# Patient Record
Sex: MALE | Race: WHITE | NOT HISPANIC OR LATINO | URBAN - METROPOLITAN AREA
[De-identification: names, ages, dates, MRNs, and addresses within clinical notes are randomized per-mention and may not be internally consistent; named-entity substitution may affect disease eponyms.]

---

## 2018-02-24 ENCOUNTER — OUTPATIENT (OUTPATIENT)
Dept: OUTPATIENT SERVICES | Facility: HOSPITAL | Age: 53
LOS: 1 days | Discharge: HOME | End: 2018-02-24

## 2018-02-24 ENCOUNTER — EMERGENCY (EMERGENCY)
Facility: HOSPITAL | Age: 53
LOS: 1 days | Discharge: HOME | End: 2018-02-24
Attending: EMERGENCY MEDICINE | Admitting: EMERGENCY MEDICINE
Payer: COMMERCIAL

## 2018-02-24 VITALS
TEMPERATURE: 98 F | OXYGEN SATURATION: 98 % | DIASTOLIC BLOOD PRESSURE: 94 MMHG | SYSTOLIC BLOOD PRESSURE: 138 MMHG | RESPIRATION RATE: 20 BRPM | HEART RATE: 75 BPM

## 2018-02-24 DIAGNOSIS — Z87.891 PERSONAL HISTORY OF NICOTINE DEPENDENCE: ICD-10-CM

## 2018-02-24 DIAGNOSIS — R07.89 OTHER CHEST PAIN: ICD-10-CM

## 2018-02-24 DIAGNOSIS — I10 ESSENTIAL (PRIMARY) HYPERTENSION: ICD-10-CM

## 2018-02-24 DIAGNOSIS — Z98.89 OTHER SPECIFIED POSTPROCEDURAL STATES: Chronic | ICD-10-CM

## 2018-02-24 DIAGNOSIS — C18.9 MALIGNANT NEOPLASM OF COLON, UNSPECIFIED: ICD-10-CM

## 2018-02-24 PROCEDURE — 93010 ELECTROCARDIOGRAM REPORT: CPT

## 2018-02-24 PROCEDURE — 99285 EMERGENCY DEPT VISIT HI MDM: CPT | Mod: 25

## 2018-02-24 RX ORDER — ASPIRIN/CALCIUM CARB/MAGNESIUM 324 MG
325 TABLET ORAL ONCE
Qty: 0 | Refills: 0 | Status: COMPLETED | OUTPATIENT
Start: 2018-02-24 | End: 2018-02-24

## 2018-02-24 NOTE — ED PROVIDER NOTE - MEDICAL DECISION MAKING DETAILS
Low-risk CP, not of anginal quality w/NSR on EKG w/out ischemic changes, negative trop, unremarkable CXR w/out infiltrate, primo abnormality, or pleural congestion, comfortable in ED after ASA, likely MSK pain, can f/u with PMD this week who will refer pt to cardiologist. Given return precautions and anticipatory guidance.

## 2018-02-24 NOTE — ED PROVIDER NOTE - PHYSICAL EXAMINATION
VITAL SIGNS: I have reviewed nursing notes and confirm.  CONSTITUTIONAL: Well-developed; well-nourished muscular man lying calmly in stretcher moving all ext speaking clearly in complete sentences, A&Ox3, no air hunger; in no acute distress.  SKIN: Agree with RN documentation regarding decubitus evaluation. Remainder of skin exam is warm and dry, no acute rash.  HEAD: Normocephalic; atraumatic.  EYES: PERRL, EOM intact; conjunctiva and sclera clear.  ENT: No nasal discharge; airway clear.  NECK: Supple; non tender.  CARD: S1, S2 normal; no murmurs, gallops, or rubs. RRR  RESP: No wheezes, rales or rhonchi. CTA w/good excursion, no chest wall TTP or overlying skin changes  ABD: Normal bowel sounds; soft; non-distended; non-tender; no hepatosplenomegaly.  EXT: Normal ROM. No clubbing, cyanosis or edema.  LYMPH: No acute cervical adenopathy.  NEURO: Alert, oriented. Grossly unremarkable.  PSYCH: Cooperative, appropriate.

## 2018-02-24 NOTE — ED PROVIDER NOTE - OBJECTIVE STATEMENT
54 y/o M w/hx of HTN though improved in past few years not on meds, former smoker, works construction/heavy lifting/frequents the gym, p/w 2 days of on/off L sided pinching chest pain non-radiating. Denies concomitant SOB/palpitations. No pleuritic or positional component. No exertional component, states he has been able to go about his normal activities w/out fatigue or intolerance. Denies fever/chills or recent illness. No n/v or diaphoresis. No LE pain or swelling. Denies travel. Had yearly physical (without stress test) 2 months ago and "everything was normal." Currently rating CP 2/10.

## 2018-02-25 VITALS
HEART RATE: 62 BPM | OXYGEN SATURATION: 97 % | DIASTOLIC BLOOD PRESSURE: 90 MMHG | RESPIRATION RATE: 20 BRPM | TEMPERATURE: 98 F | SYSTOLIC BLOOD PRESSURE: 134 MMHG

## 2018-02-25 LAB
ALBUMIN SERPL ELPH-MCNC: 4.1 G/DL — SIGNIFICANT CHANGE UP (ref 3.3–5)
ALP SERPL-CCNC: 64 U/L — SIGNIFICANT CHANGE UP (ref 40–120)
ALT FLD-CCNC: 36 U/L — SIGNIFICANT CHANGE UP (ref 10–45)
ANION GAP SERPL CALC-SCNC: 11 MMOL/L — SIGNIFICANT CHANGE UP (ref 5–17)
APTT BLD: 29 SEC — SIGNIFICANT CHANGE UP (ref 27.5–37.4)
AST SERPL-CCNC: 33 U/L — SIGNIFICANT CHANGE UP (ref 10–40)
BASOPHILS NFR BLD AUTO: 0.3 % — SIGNIFICANT CHANGE UP (ref 0–2)
BILIRUB SERPL-MCNC: 0.3 MG/DL — SIGNIFICANT CHANGE UP (ref 0.2–1.2)
BUN SERPL-MCNC: 17 MG/DL — SIGNIFICANT CHANGE UP (ref 7–23)
CALCIUM SERPL-MCNC: 9.4 MG/DL — SIGNIFICANT CHANGE UP (ref 8.4–10.5)
CHLORIDE SERPL-SCNC: 100 MMOL/L — SIGNIFICANT CHANGE UP (ref 96–108)
CO2 SERPL-SCNC: 25 MMOL/L — SIGNIFICANT CHANGE UP (ref 22–31)
CREAT SERPL-MCNC: 1.16 MG/DL — SIGNIFICANT CHANGE UP (ref 0.5–1.3)
EOSINOPHIL NFR BLD AUTO: 2.5 % — SIGNIFICANT CHANGE UP (ref 0–6)
GLUCOSE SERPL-MCNC: 106 MG/DL — HIGH (ref 70–99)
HCT VFR BLD CALC: 45.7 % — SIGNIFICANT CHANGE UP (ref 39–50)
HGB BLD-MCNC: 15.1 G/DL — SIGNIFICANT CHANGE UP (ref 13–17)
INR BLD: 0.96 — SIGNIFICANT CHANGE UP (ref 0.88–1.16)
LYMPHOCYTES # BLD AUTO: 27.7 % — SIGNIFICANT CHANGE UP (ref 13–44)
MCHC RBC-ENTMCNC: 29.7 PG — SIGNIFICANT CHANGE UP (ref 27–34)
MCHC RBC-ENTMCNC: 33 G/DL — SIGNIFICANT CHANGE UP (ref 32–36)
MCV RBC AUTO: 90 FL — SIGNIFICANT CHANGE UP (ref 80–100)
MONOCYTES NFR BLD AUTO: 7.4 % — SIGNIFICANT CHANGE UP (ref 2–14)
NEUTROPHILS NFR BLD AUTO: 62.1 % — SIGNIFICANT CHANGE UP (ref 43–77)
NT-PROBNP SERPL-SCNC: 7 PG/ML — SIGNIFICANT CHANGE UP (ref 0–300)
PLATELET # BLD AUTO: 328 K/UL — SIGNIFICANT CHANGE UP (ref 150–400)
POTASSIUM SERPL-MCNC: 3.8 MMOL/L — SIGNIFICANT CHANGE UP (ref 3.5–5.3)
POTASSIUM SERPL-SCNC: 3.8 MMOL/L — SIGNIFICANT CHANGE UP (ref 3.5–5.3)
PROT SERPL-MCNC: 6.7 G/DL — SIGNIFICANT CHANGE UP (ref 6–8.3)
PROTHROM AB SERPL-ACNC: 10.6 SEC — SIGNIFICANT CHANGE UP (ref 9.8–12.7)
RBC # BLD: 5.08 M/UL — SIGNIFICANT CHANGE UP (ref 4.2–5.8)
RBC # FLD: 12.5 % — SIGNIFICANT CHANGE UP (ref 10.3–16.9)
SODIUM SERPL-SCNC: 136 MMOL/L — SIGNIFICANT CHANGE UP (ref 135–145)
TROPONIN T SERPL-MCNC: <0.01 NG/ML — SIGNIFICANT CHANGE UP (ref 0–0.01)
WBC # BLD: 9.7 K/UL — SIGNIFICANT CHANGE UP (ref 3.8–10.5)
WBC # FLD AUTO: 9.7 K/UL — SIGNIFICANT CHANGE UP (ref 3.8–10.5)

## 2018-02-25 PROCEDURE — 71045 X-RAY EXAM CHEST 1 VIEW: CPT | Mod: 26

## 2018-02-25 PROCEDURE — 85025 COMPLETE CBC W/AUTO DIFF WBC: CPT

## 2018-02-25 PROCEDURE — 85610 PROTHROMBIN TIME: CPT

## 2018-02-25 PROCEDURE — 85730 THROMBOPLASTIN TIME PARTIAL: CPT

## 2018-02-25 PROCEDURE — 99283 EMERGENCY DEPT VISIT LOW MDM: CPT | Mod: 25

## 2018-02-25 PROCEDURE — 83880 ASSAY OF NATRIURETIC PEPTIDE: CPT

## 2018-02-25 PROCEDURE — 36415 COLL VENOUS BLD VENIPUNCTURE: CPT

## 2018-02-25 PROCEDURE — 71045 X-RAY EXAM CHEST 1 VIEW: CPT

## 2018-02-25 PROCEDURE — 93005 ELECTROCARDIOGRAM TRACING: CPT

## 2018-02-25 PROCEDURE — 80053 COMPREHEN METABOLIC PANEL: CPT

## 2018-02-25 PROCEDURE — 84484 ASSAY OF TROPONIN QUANT: CPT

## 2018-02-25 RX ADMIN — Medication 325 MILLIGRAM(S): at 00:10

## 2018-03-18 ENCOUNTER — OUTPATIENT (OUTPATIENT)
Dept: OUTPATIENT SERVICES | Facility: HOSPITAL | Age: 53
LOS: 1 days | Discharge: HOME | End: 2018-03-18

## 2018-03-18 DIAGNOSIS — Z98.89 OTHER SPECIFIED POSTPROCEDURAL STATES: Chronic | ICD-10-CM

## 2018-03-19 ENCOUNTER — APPOINTMENT (OUTPATIENT)
Dept: UROLOGY | Facility: CLINIC | Age: 53
End: 2018-03-19
Payer: COMMERCIAL

## 2018-03-19 VITALS
BODY MASS INDEX: 28.63 KG/M2 | SYSTOLIC BLOOD PRESSURE: 123 MMHG | HEIGHT: 70 IN | DIASTOLIC BLOOD PRESSURE: 81 MMHG | WEIGHT: 200 LBS | HEART RATE: 55 BPM

## 2018-03-19 DIAGNOSIS — N40.0 BENIGN PROSTATIC HYPERPLASIA WITHOUT LOWER URINARY TRACT SYMPMS: ICD-10-CM

## 2018-03-19 DIAGNOSIS — Z87.828 PERSONAL HISTORY OF OTHER (HEALED) PHYSICAL INJURY AND TRAUMA: ICD-10-CM

## 2018-03-19 DIAGNOSIS — Z80.42 FAMILY HISTORY OF MALIGNANT NEOPLASM OF PROSTATE: ICD-10-CM

## 2018-03-19 DIAGNOSIS — R39.12 POOR URINARY STREAM: ICD-10-CM

## 2018-03-19 DIAGNOSIS — Z78.9 OTHER SPECIFIED HEALTH STATUS: ICD-10-CM

## 2018-03-19 DIAGNOSIS — R35.0 FREQUENCY OF MICTURITION: ICD-10-CM

## 2018-03-19 DIAGNOSIS — R35.1 NOCTURIA: ICD-10-CM

## 2018-03-19 PROCEDURE — 99203 OFFICE O/P NEW LOW 30 MIN: CPT

## 2018-03-20 DIAGNOSIS — R35.1 NOCTURIA: ICD-10-CM

## 2018-03-22 LAB
APPEARANCE: CLEAR
BILIRUBIN URINE: NEGATIVE
BLOOD URINE: NEGATIVE
COLOR: YELLOW
GLUCOSE QUALITATIVE U: NEGATIVE MG/DL
KETONES URINE: NEGATIVE
LEUKOCYTE ESTERASE URINE: NEGATIVE
NITRITE URINE: NEGATIVE
PH URINE: 5
PROTEIN URINE: NEGATIVE MG/DL
SPECIFIC GRAVITY URINE: 1.02
UROBILINOGEN URINE: NEGATIVE MG/DL

## 2018-07-24 ENCOUNTER — EMERGENCY (EMERGENCY)
Facility: HOSPITAL | Age: 53
LOS: 1 days | Discharge: HOME | End: 2018-07-24
Attending: EMERGENCY MEDICINE | Admitting: EMERGENCY MEDICINE
Payer: COMMERCIAL

## 2018-07-24 VITALS
DIASTOLIC BLOOD PRESSURE: 91 MMHG | OXYGEN SATURATION: 98 % | SYSTOLIC BLOOD PRESSURE: 135 MMHG | TEMPERATURE: 98 F | RESPIRATION RATE: 20 BRPM | HEART RATE: 70 BPM

## 2018-07-24 VITALS
RESPIRATION RATE: 20 BRPM | HEIGHT: 70 IN | DIASTOLIC BLOOD PRESSURE: 91 MMHG | OXYGEN SATURATION: 97 % | WEIGHT: 200.84 LBS | HEART RATE: 67 BPM | SYSTOLIC BLOOD PRESSURE: 128 MMHG | TEMPERATURE: 98 F

## 2018-07-24 DIAGNOSIS — Z98.89 OTHER SPECIFIED POSTPROCEDURAL STATES: Chronic | ICD-10-CM

## 2018-07-24 DIAGNOSIS — I10 ESSENTIAL (PRIMARY) HYPERTENSION: ICD-10-CM

## 2018-07-24 DIAGNOSIS — R07.2 PRECORDIAL PAIN: ICD-10-CM

## 2018-07-24 LAB
ALBUMIN SERPL ELPH-MCNC: 4.2 G/DL — SIGNIFICANT CHANGE UP (ref 3.3–5)
ALP SERPL-CCNC: 58 U/L — SIGNIFICANT CHANGE UP (ref 40–120)
ALT FLD-CCNC: 27 U/L — SIGNIFICANT CHANGE UP (ref 10–45)
ANION GAP SERPL CALC-SCNC: 10 MMOL/L — SIGNIFICANT CHANGE UP (ref 5–17)
APTT BLD: 26.2 SEC — LOW (ref 27.5–37.4)
AST SERPL-CCNC: 31 U/L — SIGNIFICANT CHANGE UP (ref 10–40)
BASOPHILS NFR BLD AUTO: 0.3 % — SIGNIFICANT CHANGE UP (ref 0–2)
BILIRUB SERPL-MCNC: 0.5 MG/DL — SIGNIFICANT CHANGE UP (ref 0.2–1.2)
BUN SERPL-MCNC: 14 MG/DL — SIGNIFICANT CHANGE UP (ref 7–23)
CALCIUM SERPL-MCNC: 9.4 MG/DL — SIGNIFICANT CHANGE UP (ref 8.4–10.5)
CHLORIDE SERPL-SCNC: 102 MMOL/L — SIGNIFICANT CHANGE UP (ref 96–108)
CK MB CFR SERPL CALC: 12.4 NG/ML — HIGH (ref 0–6.7)
CK SERPL-CCNC: 650 U/L — HIGH (ref 30–200)
CO2 SERPL-SCNC: 28 MMOL/L — SIGNIFICANT CHANGE UP (ref 22–31)
CREAT SERPL-MCNC: 1.35 MG/DL — HIGH (ref 0.5–1.3)
EOSINOPHIL NFR BLD AUTO: 1.2 % — SIGNIFICANT CHANGE UP (ref 0–6)
GLUCOSE SERPL-MCNC: 108 MG/DL — HIGH (ref 70–99)
HCT VFR BLD CALC: 44.9 % — SIGNIFICANT CHANGE UP (ref 39–50)
HGB BLD-MCNC: 14.5 G/DL — SIGNIFICANT CHANGE UP (ref 13–17)
INR BLD: 0.96 — SIGNIFICANT CHANGE UP (ref 0.88–1.16)
LYMPHOCYTES # BLD AUTO: 20.5 % — SIGNIFICANT CHANGE UP (ref 13–44)
MCHC RBC-ENTMCNC: 30.3 PG — SIGNIFICANT CHANGE UP (ref 27–34)
MCHC RBC-ENTMCNC: 32.3 G/DL — SIGNIFICANT CHANGE UP (ref 32–36)
MCV RBC AUTO: 93.7 FL — SIGNIFICANT CHANGE UP (ref 80–100)
MONOCYTES NFR BLD AUTO: 5.9 % — SIGNIFICANT CHANGE UP (ref 2–14)
NEUTROPHILS NFR BLD AUTO: 72.1 % — SIGNIFICANT CHANGE UP (ref 43–77)
PLATELET # BLD AUTO: 291 K/UL — SIGNIFICANT CHANGE UP (ref 150–400)
POTASSIUM SERPL-MCNC: 4.8 MMOL/L — SIGNIFICANT CHANGE UP (ref 3.5–5.3)
POTASSIUM SERPL-SCNC: 4.8 MMOL/L — SIGNIFICANT CHANGE UP (ref 3.5–5.3)
PROT SERPL-MCNC: 6.9 G/DL — SIGNIFICANT CHANGE UP (ref 6–8.3)
PROTHROM AB SERPL-ACNC: 10.7 SEC — SIGNIFICANT CHANGE UP (ref 9.8–12.7)
RBC # BLD: 4.79 M/UL — SIGNIFICANT CHANGE UP (ref 4.2–5.8)
RBC # FLD: 12.5 % — SIGNIFICANT CHANGE UP (ref 10.3–16.9)
SODIUM SERPL-SCNC: 140 MMOL/L — SIGNIFICANT CHANGE UP (ref 135–145)
TROPONIN T SERPL-MCNC: <0.01 NG/ML — SIGNIFICANT CHANGE UP (ref 0–0.01)
WBC # BLD: 9.5 K/UL — SIGNIFICANT CHANGE UP (ref 3.8–10.5)
WBC # FLD AUTO: 9.5 K/UL — SIGNIFICANT CHANGE UP (ref 3.8–10.5)

## 2018-07-24 PROCEDURE — 99285 EMERGENCY DEPT VISIT HI MDM: CPT | Mod: 25

## 2018-07-24 PROCEDURE — 36415 COLL VENOUS BLD VENIPUNCTURE: CPT

## 2018-07-24 PROCEDURE — 80053 COMPREHEN METABOLIC PANEL: CPT

## 2018-07-24 PROCEDURE — 71046 X-RAY EXAM CHEST 2 VIEWS: CPT

## 2018-07-24 PROCEDURE — 85730 THROMBOPLASTIN TIME PARTIAL: CPT

## 2018-07-24 PROCEDURE — 84484 ASSAY OF TROPONIN QUANT: CPT

## 2018-07-24 PROCEDURE — 71046 X-RAY EXAM CHEST 2 VIEWS: CPT | Mod: 26

## 2018-07-24 PROCEDURE — 82553 CREATINE MB FRACTION: CPT

## 2018-07-24 PROCEDURE — 99284 EMERGENCY DEPT VISIT MOD MDM: CPT | Mod: 25

## 2018-07-24 PROCEDURE — 82550 ASSAY OF CK (CPK): CPT

## 2018-07-24 PROCEDURE — 93010 ELECTROCARDIOGRAM REPORT: CPT

## 2018-07-24 PROCEDURE — 85610 PROTHROMBIN TIME: CPT

## 2018-07-24 PROCEDURE — 85025 COMPLETE CBC W/AUTO DIFF WBC: CPT

## 2018-07-24 PROCEDURE — 93005 ELECTROCARDIOGRAM TRACING: CPT

## 2018-07-24 RX ORDER — SODIUM CHLORIDE 9 MG/ML
3 INJECTION INTRAMUSCULAR; INTRAVENOUS; SUBCUTANEOUS ONCE
Qty: 0 | Refills: 0 | Status: COMPLETED | OUTPATIENT
Start: 2018-07-24 | End: 2018-07-24

## 2018-07-24 RX ORDER — IBUPROFEN 200 MG
600 TABLET ORAL ONCE
Qty: 0 | Refills: 0 | Status: COMPLETED | OUTPATIENT
Start: 2018-07-24 | End: 2018-07-24

## 2018-07-24 RX ORDER — ASPIRIN/CALCIUM CARB/MAGNESIUM 324 MG
325 TABLET ORAL ONCE
Qty: 0 | Refills: 0 | Status: COMPLETED | OUTPATIENT
Start: 2018-07-24 | End: 2018-07-24

## 2018-07-24 RX ADMIN — Medication 325 MILLIGRAM(S): at 11:01

## 2018-07-24 RX ADMIN — Medication 600 MILLIGRAM(S): at 12:08

## 2018-07-24 RX ADMIN — SODIUM CHLORIDE 3 MILLILITER(S): 9 INJECTION INTRAMUSCULAR; INTRAVENOUS; SUBCUTANEOUS at 10:50

## 2018-07-24 NOTE — ED PROVIDER NOTE - MEDICAL DECISION MAKING DETAILS
ED course unremarkable - afebrile and hemodynamically stable. ED course unremarkable - afebrile and hemodynamically stable. Pt received ASA 325mg PO in ED initially. EKG w/o evidence of acute ischemia. Troponin negative. CKMB and CK mildly elevated, likely in the setting of MSK injury. CBC, CMP and coags unremarkable. CXR reviewed by me and without acute cardiopulmonary abnormality. Low risk for MACE as HEART score 2. Low suspicion for PE. Recommend ibuprofen/tylenol for chest wall pain. Will provide information for PMD follow up. Return precautions given.

## 2018-07-24 NOTE — ED PROVIDER NOTE - OBJECTIVE STATEMENT
53yoM without pmhx of HTN presents with substernal CP since 10pm last night. States pain started at rest, has been constant but fluctuates in intensity. Pain with worse with movement. Denies fever, cough, shortness of breath, radiation of pain, N/V, leg swelling, personal or family cardiac hx, smoking, hx of DVT/PE. Has not taken anything for pain. Does not have a PMD. 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

## 2018-07-24 NOTE — ED ADULT NURSE NOTE - OBJECTIVE STATEMENT
Pt presents complaining of 8/10 constant chest pain since last night. Pt states "I don't know if its something I did at work. I am a . The chest pain is really sharp and always there since last night. I have never had this pain before." Pt denies any fevers, no lightheadedness, no dizziness, no sob, no n/v, no changes to bowels, no urinary complaints.

## 2018-07-24 NOTE — ED PROVIDER NOTE - ATTENDING CONTRIBUTION TO CARE
52 yo M with PMH of HTN p/w continuous substernal CP since 10pm last night, which with worsens with movement. Denies fever, cough, shortness of breath, radiation of pain, N/V, leg swelling, personal or family cardiac hx, smoking, hx of DVT/PE. Has not taken anything for pain. 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 Pt AAO, NAD, RRR, CTA b/l, abd: soft/NT. EKG w/o evidence of acute ischemia. Troponin negative despite several hours of pain. CXR with NAD. Suspect MSK etiology of pain. Recommend ibuprofen/tylenol for pain and to f/up outpt fo further evaluation.

## 2018-07-24 NOTE — ED ADULT NURSE NOTE - CHPI ED SYMPTOMS NEG
no fever/no diaphoresis/no nausea/no shortness of breath/no syncope/no back pain/no cough/no dizziness/no vomiting/no chills

## 2018-07-24 NOTE — ED PROVIDER NOTE - PHYSICAL EXAMINATION
VITAL SIGNS: I have reviewed nursing notes and confirm.  CONSTITUTIONAL: Well-developed; well-nourished; in no acute distress.   SKIN:  warm and dry, no acute rash.   HEAD:  normocephalic, atraumatic.  EYES: PERRL, EOM intact; conjunctiva and sclera clear.  ENT: No nasal discharge; airway clear.   NECK: Supple; non tender.  CARD: Nttp of chest wall. S1, S2 normal; no murmurs, gallops, or rubs. Regular rate and rhythm.   RESP:  Clear to auscultation b/l, no wheezes, rales or rhonchi.  ABD: Normal bowel sounds; soft; non-distended; non-tender; no guarding/ rebound.  EXT: Normal ROM. No clubbing, cyanosis or edema. 2+ pulses to b/l ue/le.  NEURO: Alert, oriented, grossly unremarkable  PSYCH: Cooperative, mood and affect appropriate.

## 2018-07-24 NOTE — ED PROVIDER NOTE - NS ED ROS FT
Constitutional: No fever. No chills.  Eyes: No redness. No discharge. No vision change.   ENT: No sore throat. No ear pain.  Cardiovascular: +chest pain. No leg swelling.  Respiratory: No cough. No shortness of breath.  GI: No abdominal pain. No vomiting. No diarrhea.   MSK: No joint pain. No back pain.   Skin: No rash. No abrasions.   Neuro: No numbness. No weakness.   Psych: No known mental health issues.

## 2019-03-01 NOTE — ED ADULT NURSE NOTE - NEURO ASSESSMENT
Discharge Planner OT   Patient plan for discharge: Not stated  Current status: SBA and 4WW needed to ambulate 400'. SBA needed for sit<>stand transfers x3 from various surfaces. Mod-max A needed to wash hair over sink, with pt IND with brushing hair when seated upright.  Barriers to return to prior living situation: medical status, activity tolerance, strength  Recommendations for discharge: TCU  Rationale for recommendations: To increase strength and activity tolerance needed to return to baseline independence with ADLs.       Entered by: Kylee Donahue 03/01/2019 12:09 PM        WDL

## 2019-10-08 ENCOUNTER — APPOINTMENT (OUTPATIENT)
Dept: ENDOCRINOLOGY | Facility: CLINIC | Age: 54
End: 2019-10-08
Payer: COMMERCIAL

## 2019-10-08 VITALS
WEIGHT: 200 LBS | SYSTOLIC BLOOD PRESSURE: 120 MMHG | DIASTOLIC BLOOD PRESSURE: 70 MMHG | HEART RATE: 75 BPM | TEMPERATURE: 98.1 F | BODY MASS INDEX: 28.63 KG/M2 | OXYGEN SATURATION: 98 % | HEIGHT: 70 IN

## 2019-10-08 DIAGNOSIS — R19.7 DIARRHEA, UNSPECIFIED: ICD-10-CM

## 2019-10-08 DIAGNOSIS — N52.9 MALE ERECTILE DYSFUNCTION, UNSPECIFIED: ICD-10-CM

## 2019-10-08 DIAGNOSIS — R53.83 OTHER FATIGUE: ICD-10-CM

## 2019-10-08 DIAGNOSIS — R79.89 OTHER SPECIFIED ABNORMAL FINDINGS OF BLOOD CHEMISTRY: ICD-10-CM

## 2019-10-08 DIAGNOSIS — R06.02 SHORTNESS OF BREATH: ICD-10-CM

## 2019-10-08 DIAGNOSIS — F32.9 MAJOR DEPRESSIVE DISORDER, SINGLE EPISODE, UNSPECIFIED: ICD-10-CM

## 2019-10-08 DIAGNOSIS — Z86.59 PERSONAL HISTORY OF OTHER MENTAL AND BEHAVIORAL DISORDERS: ICD-10-CM

## 2019-10-08 PROCEDURE — 99203 OFFICE O/P NEW LOW 30 MIN: CPT

## 2019-10-08 NOTE — REVIEW OF SYSTEMS
[All other systems negative] : All other systems negative [Fatigue] : fatigue [Erectile Dysfunction] : erectile dysfunction [Depression] : depression

## 2019-10-08 NOTE — HISTORY OF PRESENT ILLNESS
[FreeTextEntry1] : CC: Check testosterone\par This is a 54-year-old male with a history of depression, migraines, here for evaluation of fatigue, erectile dysfunction, and possible low testosterone. \par He does not believe his testosterone has been checked before that he believes he may have been on testosterone gel in the past.\par He has occasional morning erections. He denies poor libido.

## 2019-10-08 NOTE — PHYSICAL EXAM
[Alert] : alert [No Acute Distress] : no acute distress [Well Nourished] : well nourished [Well Developed] : well developed [Healthy Appearance] : healthy appearance [Normal Voice/Communication] : normal voice communication [Normal Sclera/Conjunctiva] : normal sclera/conjunctiva [No Proptosis] : no proptosis [Normal Oropharynx] : the oropharynx was normal [No Neck Mass] : no neck mass was observed [Supple] : the neck was supple [No LAD] : no lymphadenopathy [Thyroid Not Enlarged] : the thyroid was not enlarged [No Respiratory Distress] : no respiratory distress [Normal Rate and Effort] : normal respiratory rhythm and effort [No Accessory Muscle Use] : no accessory muscle use [Clear to Auscultation] : lungs were clear to auscultation bilaterally [Normal Rate] : heart rate was normal  [Normal S1, S2] : normal S1 and S2 [Regular Rhythm] : with a regular rhythm [No Edema] : there was no peripheral edema [No Stigmata of Cushings Syndrome] : no stigmata of cushings syndrome [Normal Gait] : normal gait [No Clubbing, Cyanosis] : no clubbing  or cyanosis of the fingernails [No Involuntary Movements] : no involuntary movements were seen

## 2019-10-08 NOTE — ASSESSMENT
[FreeTextEntry1] : This is a 54 year old male with fatigue, depression, erectile dysfunction, here for evaluation. \par Check morning fasting testosterone level to evaluate for hypogonadism.\par Check TSH. \par He has a history of depression and he is requesting psychiatry referral. He denies suicidal/homicidal ideation. \par He requests GI eval for colonoscopy and referral was given. \par Further management pending bloodwork.

## 2019-10-09 LAB
ALBUMIN SERPL ELPH-MCNC: 4.3 G/DL
ALP BLD-CCNC: 71 U/L
ALT SERPL-CCNC: 37 U/L
ANION GAP SERPL CALC-SCNC: 13 MMOL/L
AST SERPL-CCNC: 40 U/L
BASOPHILS # BLD AUTO: 0.06 K/UL
BASOPHILS NFR BLD AUTO: 0.9 %
BILIRUB SERPL-MCNC: 0.4 MG/DL
BUN SERPL-MCNC: 23 MG/DL
CALCIUM SERPL-MCNC: 9.9 MG/DL
CHLORIDE SERPL-SCNC: 103 MMOL/L
CO2 SERPL-SCNC: 26 MMOL/L
CREAT SERPL-MCNC: 1.39 MG/DL
EOSINOPHIL # BLD AUTO: 0.3 K/UL
EOSINOPHIL NFR BLD AUTO: 4.6 %
GLUCOSE SERPL-MCNC: 90 MG/DL
HCT VFR BLD CALC: 47.7 %
HGB BLD-MCNC: 15.4 G/DL
IMM GRANULOCYTES NFR BLD AUTO: 0.6 %
LYMPHOCYTES # BLD AUTO: 2.63 K/UL
LYMPHOCYTES NFR BLD AUTO: 40.1 %
MAN DIFF?: NORMAL
MCHC RBC-ENTMCNC: 30.5 PG
MCHC RBC-ENTMCNC: 32.3 GM/DL
MCV RBC AUTO: 94.5 FL
MONOCYTES # BLD AUTO: 0.45 K/UL
MONOCYTES NFR BLD AUTO: 6.9 %
NEUTROPHILS # BLD AUTO: 3.08 K/UL
NEUTROPHILS NFR BLD AUTO: 46.9 %
PLATELET # BLD AUTO: 348 K/UL
POTASSIUM SERPL-SCNC: 4.2 MMOL/L
PROT SERPL-MCNC: 6.5 G/DL
RBC # BLD: 5.05 M/UL
RBC # FLD: 12.1 %
SODIUM SERPL-SCNC: 142 MMOL/L
TSH SERPL-ACNC: 2.23 UIU/ML
WBC # FLD AUTO: 6.56 K/UL

## 2019-10-14 LAB
TESTOST BND SERPL-MCNC: 10.3 PG/ML
TESTOST SERPL-MCNC: 487 NG/DL

## 2019-10-23 ENCOUNTER — RESULT REVIEW (OUTPATIENT)
Age: 54
End: 2019-10-23

## 2019-12-16 ENCOUNTER — APPOINTMENT (OUTPATIENT)
Dept: GASTROENTEROLOGY | Facility: CLINIC | Age: 54
End: 2019-12-16

## 2020-12-17 NOTE — ED ADULT NURSE NOTE - RESPIRATORY WDL
Quality 111:Pneumonia Vaccination Status For Older Adults: Pneumococcal Vaccination Previously Received
Detail Level: Detailed
Quality 130: Documentation Of Current Medications In The Medical Record: Current Medications Documented
Breathing spontaneous and unlabored. Breath sounds clear and equal bilaterally with regular rhythm.

## 2021-09-18 NOTE — ED PROVIDER NOTE - CARE PLAN
Unable to reach patient after 2 attempts.  Left voice message at phone number given (# [phone]).     Principal Discharge DX:	Chest pain, atypical

## 2022-02-24 NOTE — ED ADULT NURSE NOTE - GENITOURINARY WDL
Bladder non-tender and non-distended. Urine clear yellow. O-T Plasty Text: The defect edges were debeveled with a #15 scalpel blade.  Given the location of the defect, shape of the defect and the proximity to free margins an O-T plasty was deemed most appropriate.  Using a sterile surgical marker, an appropriate O-T plasty was drawn incorporating the defect and placing the expected incisions within the relaxed skin tension lines where possible.    The area thus outlined was incised deep to adipose tissue with a #15 scalpel blade.  The skin margins were undermined to an appropriate distance in all directions utilizing iris scissors.

## 2025-05-05 ENCOUNTER — EMERGENCY (EMERGENCY)
Facility: HOSPITAL | Age: 60
LOS: 1 days | End: 2025-05-05
Admitting: STUDENT IN AN ORGANIZED HEALTH CARE EDUCATION/TRAINING PROGRAM
Payer: COMMERCIAL

## 2025-05-05 VITALS
TEMPERATURE: 98 F | HEART RATE: 59 BPM | SYSTOLIC BLOOD PRESSURE: 129 MMHG | OXYGEN SATURATION: 94 % | DIASTOLIC BLOOD PRESSURE: 88 MMHG | RESPIRATION RATE: 16 BRPM | WEIGHT: 220.02 LBS | HEIGHT: 70 IN

## 2025-05-05 DIAGNOSIS — Z98.89 OTHER SPECIFIED POSTPROCEDURAL STATES: Chronic | ICD-10-CM

## 2025-05-05 PROCEDURE — 99283 EMERGENCY DEPT VISIT LOW MDM: CPT
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
## 2025-05-05 NOTE — ED PROVIDER NOTE - PHYSICAL EXAMINATION
CONSTITUTIONAL: Well-appearing;  in no apparent distress.   HEAD: Normocephalic; atraumatic.   CARDIOVASCULAR: rrr,  RESPIRATORY: Breathing easily;   MSK: FROM at all extremities, normal tone   EXT: No cyanosis or edema; N/V intact  SKIN: R flank- linear area of scabs ~2% area, no blistering, no surrounding erythema

## 2025-05-05 NOTE — ED PROVIDER NOTE - NSFOLLOWUPINSTRUCTIONS_ED_ALL_ED_FT
Chemical Skin Burn    WHAT YOU NEED TO KNOW:    Examples of chemicals that can burn skin are found in cleaning products, , and pesticides. Chemicals may also be found in some workplaces, such as wet or dry cement or battery acid. Signs and symptoms may not develop for several days. A burn may be worse than it appears at first. It may also get worse over the first few days.    WHILE YOU ARE HERE:    Informed consent is a legal document that explains the tests, treatments, or procedures that you may need. Informed consent means you understand what will be done and can make decisions about what you want. You give your permission when you sign the consent form. You can have someone sign this form for you if you are not able to sign it. You have the right to understand your medical care in words you know. Before you sign the consent form, understand the risks and benefits of what will be done. Make sure all your questions are answered.    A Elaine catheter is a tube put into your bladder to drain urine into a bag. Keep the bag below your waist. This will prevent urine from flowing back into your bladder and causing an infection or other problems. Also, keep the tube free of kinks so the urine will drain properly. Do not pull on the catheter. This can cause pain and bleeding, and may cause the catheter to come out.    Intake and output: Healthcare providers may keep track of the amount of liquid you are getting. They also may need to know how much you are urinating. Ask healthcare providers if they need to measure or collect your urine.    Medicines:    Pain medicine may be given. Do not wait until the pain is severe before you ask for more medicine.    Antibiotics help prevent or treat an infection caused by bacteria.    Anti-itch medicine may be given to help decrease irritation and itching. This medicine may be given as a cream, pill, injection, or through your IV.  Blood and urine tests may be done to check your electrolyte (body salts) levels. They may also be done to get information about your overall health.    Treatment:    Debridement may be done 1 or 2 times each day. Healthcare providers remove damaged tissue to prevent infection, decrease inflammation, and improve healing. A graft may be placed over the area where skin was removed. A graft may be artificial or made from donor skin.    IV fluids may be given if your burns cover a large area of your body. You may receive liquids through a tube that is placed in your vein to treat your dehydration. The liquids may also include electrolytes, such as potassium.  Physical therapy may be needed if the skin over a joint is burned. A physical therapist teaches you exercises to help improve movement and strength, and to decrease pain. You may need to continue physical therapy at home.    RISKS:    You may develop an infection in the burn area. You may have scarring after your burn heals. Scarring over joints can cause loss of motion. You may lose body fluids if your burn covers a large area of your body. This could lead to dehydration. You may need surgery to remove skin or scar tissue.    CARE AGREEMENT:    You have the right to help plan your care. Learn about your health condition and how it may be treated. Discuss treatment options with your healthcare providers to decide what care you want to receive. You always have the right to refuse treatment.
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
## 2025-05-05 NOTE — ED PROVIDER NOTE - PATIENT PORTAL LINK FT
You can access the FollowMyHealth Patient Portal offered by [facility] by registering at the following website: [email or web address]. By joining [employer]’s FollowMyHealth portal, you will also be able to view your health information using other applications (apps) compatible with our system.

## 2025-05-05 NOTE — ED ADULT NURSE NOTE - NSFALLUNIVINTERV_ED_ALL_ED
Bed/Stretcher in lowest position, wheels locked, appropriate side rails in place/Call bell, personal items and telephone in reach/Instruct patient to call for assistance before getting out of bed/chair/stretcher/Non-slip footwear applied when patient is off stretcher/[place] to call system/Physically safe environment - no spills, clutter or unnecessary equipment/Purposeful proactive rounding/Room/bathroom lighting operational, light cord in reach

## 2025-05-07 DIAGNOSIS — T21.42XA: ICD-10-CM
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
## 2025-06-30 NOTE — ED ADULT NURSE NOTE - PAIN RATING/NUMBER SCALE (0-10): ACTIVITY
Per note in  called Warren to notify patient would be coming due for follow up around (10/18/25). Patients insurance is flagging as out of network with [facility]. Warren stated that patient is working on getting insurance figured out. She will call to schedule once insurance issue is sorted out.  
5 (moderate pain)